# Patient Record
(demographics unavailable — no encounter records)

---

## 2024-12-18 NOTE — REASON FOR VISIT
[Other Location: e.g. School (Enter Location, City,State)___] : at [unfilled], at the time of the visit. [Medical Office: ([facility])___] : at the medical office located in  [FreeTextEntry2] : GH aide [FreeTextEntry3] : forma caregiver [Follow-Up: _____] : a [unfilled] follow-up visit [Formal Caregiver] : formal caregiver

## 2024-12-18 NOTE — ASSESSMENT
[FreeTextEntry1] : 58y M with chronic phenytoin use - presumably for h/o seizure. No recent seizure noted. Significant problems with gums. Inferred reason for referral is to determine alternatives to phenytoin. Now tolerating lacosamide - level 4.6 on 6/12/22.   Plan 1. continue Vimpat 150mg BID 2. re-check labs - CBC, CMP, lacosamide level  3. Follow up with NUZHAT Damon in 6 mo

## 2024-12-18 NOTE — HISTORY OF PRESENT ILLNESS
[FreeTextEntry1] : Prefers to be called "Jessi"  *** 12/18/2024  *** Patient inadvertently presented for f/u to [facility] office when I am seeing patients in [place] office. Visit switched to telehealth.  Caregiver reports that Jessi is doing well, no interval seizures on lacosamide 150 q12; mood/behavior are good, no interval problems. Last seen approximately 1 years ago. Last labs are from 2022.  ECG 2023 - MD 0.132  *** 09/26/2023  *** Jessi returns for scheduled follow-up.  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  He has not had a seizure in over 1 year on lacosamide.  He recently saw Dr. Lowery of cardiology who noted some nonspecific EKG changes, but otherwise found no problem.  MD interval on EKG of 8/9/2023 was 132 ms.  Group home aide reports no other problems related to seizures.  *** 03/04/2022  *** Jessi returns for scheduled follow-up.  In the interval, he had labs drawn-CBC and CMP that were unremarkable.  He did not have phenytoin level.  EEG done today shows background slowing without focality and without epileptiform discharges.  He did not start Vimpat after the last visit due to an error in the way the prescription was written. No new problems.  *** 01/12/2022  *** Severe impulse control d/o, h/o physical aggression. H/o severe gingival problems requiring surgery.  Inferred reason for referral is to consider whether Jessi needs to continue on phenytoin or could be transitioned to alternative AED.   Meds  aripiprazole 20 daily jeannie-abelardo 8.6mg  MVI with iron daily caco4/vitD3 daily phenytoin  mg - 1 TID  Allergic haloperidol, sertraline PMH - as above FH - not available SH - lives in facility ROS - unable.

## 2025-05-20 NOTE — ASSESSMENT
[FreeTextEntry1] : ALLAN RUBIO, 62 yo  M with chronic phenytoin use - presumably for h/o seizure. No recent seizure noted. Now on Lacosamide 150 mg bid  - level 4.6 on 6/12/22.  No recent level. No ER visits No new complains.    Plan - Cont Lacosamide 150 gm bid renewed - re-ordered labs - CBC, CMP, trough Lacosamide level  - GH summary form filled out call if ER visits.   - Follow up with Dr Long in 6 months, knows to call sooner prn   x time 25 min

## 2025-05-20 NOTE — HISTORY OF PRESENT ILLNESS
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  *** 05/20/2025*** accompanied by  staff HUMBERTO ALMEIDA [phone]  ALLAN RUBIO, 60 yo M here for reg follow up visit. No interval seizures on Lacosamide 150 q12; mood/behavior are good, no interval problems.  No recent LAC level.  No ER visit. No new complains.    *** 12/18/2024  *** Patient inadvertently presented for f/u to [address] [place] [address] when I am seeing patients in [facility]. Visit switched to telehealth.  Caregiver reports that Jessi is doing well, no interval seizures on lacosamide 150 q12; mood/behavior are good, no interval problems. Last seen approximately 1 years ago. Last labs are from 2022.  ECG 2023 - [place] 0.132  *** 09/26/2023  *** Jessi returns for scheduled follow-up.  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  He has not had a seizure in over 1 year on lacosamide.  He recently saw Dr. Lowery of cardiology who noted some nonspecific EKG changes, but otherwise found no problem.  WV interval on EKG of 8/9/2023 was 132 ms.  Group home aide reports no other problems related to seizures.  *** 03/04/2022  *** Jessi returns for scheduled follow-up.  In the interval, he had labs drawn-CBC and CMP that were unremarkable.  He did not have phenytoin level.  EEG done today shows background slowing without focality and without epileptiform discharges.  He did not start Vimpat after the last visit due to an error in the way the prescription was written. No new problems.  *** 01/12/2022  *** Severe impulse control d/o, h/o physical aggression. H/o severe gingival problems requiring surgery.  Inferred reason for referral is to consider whether Jessi needs to continue on phenytoin or could be transitioned to alternative AED.   Meds  aripiprazole 20 daily jeannie-abelardo 8.6mg  MVI with iron daily caco4/vitD3 daily phenytoin  mg - 1 TID  Allergic haloperidol, sertraline PMH - as above FH - not available SH - lives in facility ROS - unable.

## 2025-05-20 NOTE — PHYSICAL EXAM
[FreeTextEntry1] : Alert and follows simple requests, speech dysarthric and sparse EOM full without sustained nystagmus, PERRL, face symmetrical, no dysarthria Motor - symmetric strength. normal rapid-alternating movements. Sensory - intact LT bilaterally Coord - no tremor, ataxia Gait - stands without difficulty, normal gait.

## 2025-05-20 NOTE — HISTORY OF PRESENT ILLNESS
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  *** 05/20/2025*** accompanied by  staff HUMBERTO ALMEIDA [phone]  ALLAN RUBIO, 60 yo M here for reg follow up visit. No interval seizures on Lacosamide 150 q12; mood/behavior are good, no interval problems.  No recent LAC level.  No ER visit. No new complains.    *** 12/18/2024  *** Patient inadvertently presented for f/u to [address] [place] [address] when I am seeing patients in [facility]. Visit switched to telehealth.  Caregiver reports that Jessi is doing well, no interval seizures on lacosamide 150 q12; mood/behavior are good, no interval problems. Last seen approximately 1 years ago. Last labs are from 2022.  ECG 2023 - [place] 0.132  *** 09/26/2023  *** Jessi returns for scheduled follow-up.  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  He has not had a seizure in over 1 year on lacosamide.  He recently saw Dr. Lowery of cardiology who noted some nonspecific EKG changes, but otherwise found no problem.  OH interval on EKG of 8/9/2023 was 132 ms.  Group home aide reports no other problems related to seizures.  *** 03/04/2022  *** Jessi returns for scheduled follow-up.  In the interval, he had labs drawn-CBC and CMP that were unremarkable.  He did not have phenytoin level.  EEG done today shows background slowing without focality and without epileptiform discharges.  He did not start Vimpat after the last visit due to an error in the way the prescription was written. No new problems.  *** 01/12/2022  *** Severe impulse control d/o, h/o physical aggression. H/o severe gingival problems requiring surgery.  Inferred reason for referral is to consider whether Jessi needs to continue on phenytoin or could be transitioned to alternative AED.   Meds  aripiprazole 20 daily jeannie-abelardo 8.6mg  MVI with iron daily caco4/vitD3 daily phenytoin  mg - 1 TID  Allergic haloperidol, sertraline PMH - as above FH - not available SH - lives in facility ROS - unable.